# Patient Record
Sex: FEMALE | Race: WHITE | NOT HISPANIC OR LATINO
[De-identification: names, ages, dates, MRNs, and addresses within clinical notes are randomized per-mention and may not be internally consistent; named-entity substitution may affect disease eponyms.]

---

## 2024-03-15 PROBLEM — Z00.00 ENCOUNTER FOR PREVENTIVE HEALTH EXAMINATION: Status: ACTIVE | Noted: 2024-03-15

## 2024-03-18 ENCOUNTER — APPOINTMENT (OUTPATIENT)
Dept: SURGICAL ONCOLOGY | Facility: CLINIC | Age: 44
End: 2024-03-18

## 2024-03-18 NOTE — HISTORY OF PRESENT ILLNESS
[de-identified] : Nicola Green  MRN: [id number]  Date of visit: 3/18/2024  Referring Provider:   Diagnosis:   43 year female  presents for evaluation of ~  ~      Currently, Ms. GREEN ~  ~  abdominal pain and discomfort, ~  ~ having decreased appetite, and ~  ~ nausea or vomiting. She ~  ~ changes in bowel habits and ~  ~ recent unintentional weight loss. She ~  ~ fevers, chills, or night sweats.    Functional Status: Ms. GREEN is able to ~  ~ without fatigue or dyspnea.    She ~  ~ genetic testing.   Work Up: